# Patient Record
(demographics unavailable — no encounter records)

---

## 2025-06-30 NOTE — HISTORY OF PRESENT ILLNESS
[FreeTextEntry1] :  18 year F presents with dysmenorrhea x 6mo.   LMP: 6/4/2025 Menses: 12yo, q24d, 5d, dysmenorrhea SA/Contraception: Never   OBHx: G0 GYNHx: Dysmenorrhea    Health Maintenance: Gardasil - Complete